# Patient Record
Sex: MALE
[De-identification: names, ages, dates, MRNs, and addresses within clinical notes are randomized per-mention and may not be internally consistent; named-entity substitution may affect disease eponyms.]

---

## 2023-02-06 PROBLEM — Z00.00 ENCOUNTER FOR PREVENTIVE HEALTH EXAMINATION: Status: ACTIVE | Noted: 2023-02-06

## 2023-03-06 ENCOUNTER — APPOINTMENT (OUTPATIENT)
Dept: CARDIOLOGY | Facility: CLINIC | Age: 33
End: 2023-03-06

## 2023-03-06 DIAGNOSIS — E78.5 HYPERLIPIDEMIA, UNSPECIFIED: ICD-10-CM

## 2023-03-06 DIAGNOSIS — R07.9 CHEST PAIN, UNSPECIFIED: ICD-10-CM

## 2023-03-06 NOTE — HISTORY OF PRESENT ILLNESS
[FreeTextEntry1] : Mr. Emery is a 33yo M with PMHx of HLD who presents to [facility] care. His PMD is Dr. Nico Jeronimo. He has been complaining of CP.

## 2024-02-06 ENCOUNTER — APPOINTMENT (OUTPATIENT)
Dept: UROLOGY | Facility: CLINIC | Age: 34
End: 2024-02-06

## 2024-08-14 ENCOUNTER — NON-APPOINTMENT (OUTPATIENT)
Age: 34
End: 2024-08-14

## 2024-08-15 ENCOUNTER — APPOINTMENT (OUTPATIENT)
Dept: UROLOGY | Facility: CLINIC | Age: 34
End: 2024-08-15
Payer: COMMERCIAL

## 2024-08-15 VITALS
SYSTOLIC BLOOD PRESSURE: 123 MMHG | TEMPERATURE: 98 F | HEART RATE: 71 BPM | HEIGHT: 68 IN | DIASTOLIC BLOOD PRESSURE: 82 MMHG | RESPIRATION RATE: 17 BRPM | OXYGEN SATURATION: 97 % | WEIGHT: 171 LBS | BODY MASS INDEX: 25.91 KG/M2

## 2024-08-15 DIAGNOSIS — Z87.09 PERSONAL HISTORY OF OTHER DISEASES OF THE RESPIRATORY SYSTEM: ICD-10-CM

## 2024-08-15 DIAGNOSIS — F52.4 PREMATURE EJACULATION: ICD-10-CM

## 2024-08-15 DIAGNOSIS — Z78.9 OTHER SPECIFIED HEALTH STATUS: ICD-10-CM

## 2024-08-15 DIAGNOSIS — N47.1 PHIMOSIS: ICD-10-CM

## 2024-08-15 DIAGNOSIS — N48.1 BALANITIS: ICD-10-CM

## 2024-08-15 DIAGNOSIS — N48.6 INDURATION PENIS PLASTICA: ICD-10-CM

## 2024-08-15 PROCEDURE — 99204 OFFICE O/P NEW MOD 45 MIN: CPT

## 2024-08-15 RX ORDER — CLOTRIMAZOLE 10 MG/G
1 CREAM TOPICAL
Qty: 1 | Refills: 2 | Status: ACTIVE | COMMUNITY
Start: 2024-08-15 | End: 1900-01-01

## 2024-08-15 NOTE — LETTER HEADER
[FreeTextEntry3] : Kyree Sanchez MD [facility], [address] [place] [address]

## 2024-08-15 NOTE — ASSESSMENT
[FreeTextEntry1] : With respect to the premature ejaculation this started when it sounds like he started to get balanitis at the tight part is very much the mucosal epithelial junction of the foreskin and is not that time.  I hope that if we do agree with formation the tissue will relax.  He does have a tight frenulum he acknowledges that when he was younger there were a lot of sexual episodes where he tore it and it would bleed he still has 1 spot where it is a little scarred if necessary after we get rid of the inflammation we can do a frenular release and release the rest of it.  As far as the curve he has a thick plaque between the corpora spongiosum and corpus cavernosum.  I am going to set him up to see Dr. Ramos who is our specialist in this to see if there is anything that he really wants to do a lot of it will depend on his partner.  He is not finding it tender but she is finding it uncomfortable.  I do not see the curve that bad and I wonder if she has some vaginal issues that would be better addressed as if the vaginal issues are addressed we getting rid of the prime problem.  Finally they are not using birth control and I have not conceived in a while but he tells me his partner is having issues.  If and when they decide to look into what we can proceed further  To summarize he is can to do balanitis care betadine, tap water, c;otrimazole We will check him to see if he has an occult diabetic We will consider a frenular release He will see Dr. Ramos about his curve We will hold off on fertility issues

## 2024-08-15 NOTE — HISTORY OF PRESENT ILLNESS
[Currently Experiencing ___] :  [unfilled] [FreeTextEntry1] : Delon is a 34-year-old male born July 20, 1990 who had a year ago started developing problems at the foreskin chest cavity tighter and tighter and tighter.  He now finds that either with masturbation or sexual activity it can get uncomfortable.  The tighter it got the more sensitive it became and he does not know if it is because of the tightness or due to other issues while he notices that over the last year he is having a harder and harder time holding back ejaculation.  During foreplay it is not much of a problem but there are times when as soon as he penetrates and he will pop.  The used to have sex sometimes 2 or 3 times the same night now he is kavita if he can recycle by the next day.  He is also not looking forward to sex is much as he did as a whole issue is becoming embarrassing.  They had a better relationship in the past and they do it now but they are working on it he is with her 11 years and they have a 10-year-old son.  Finally as long as he can remember his penis this marched down about 30 degrees and has not interfered with sexual activity he just wonders what is about.

## 2024-08-15 NOTE — LETTER BODY
[Dear  ___] : Dear  [unfilled], [Consult Letter:] : I had the pleasure of evaluating your patient, [unfilled]. [Please see my note below.] : Please see my note below. [Consult Closing:] : Thank you very much for allowing me to participate in the care of this patient.  If you have any questions, please do not hesitate to contact me. [Sincerely,] : Sincerely, [FreeTextEntry2] : Jhoan Rogel MD [address] [place], [place] [id number]

## 2024-08-15 NOTE — PHYSICAL EXAM
[General Appearance - Well Developed] : well developed [General Appearance - Well Nourished] : well nourished [Normal Appearance] : normal appearance [Well Groomed] : well groomed [General Appearance - In No Acute Distress] : no acute distress [Edema] : no peripheral edema [Respiration, Rhythm And Depth] : normal respiratory rhythm and effort [Exaggerated Use Of Accessory Muscles For Inspiration] : no accessory muscle use [Auscultation Breath Sounds / Voice Sounds] : lungs were clear to auscultation bilaterally [Abdomen Soft] : soft [Abdomen Tenderness] : non-tender [Abdomen Hernia] : no hernia was discovered [Costovertebral Angle Tenderness] : no ~M costovertebral angle tenderness [Urinary Bladder Findings] : the bladder was normal on palpation [Normal Station and Gait] : the gait and station were normal for the patient's age [] : no rash [No Focal Deficits] : no focal deficits [Oriented To Time, Place, And Person] : oriented to person, place, and time [Affect] : the affect was normal [Mood] : the mood was normal [Penis Abnormality] : normal uncircumcised penis [Epididymis] : the epididymides were normal [Testes Tenderness] : no tenderness of the testes [Not Anxious] : not anxious [FreeTextEntry1] : 25 [de-identified] : tight frenulum with prior tears, mild balanitis, minimally tight tip of the foreskin. He has Peyronie's plaque that is about 7 to 8 mm wide running between the cavernosum and spongiosum almost from the region of the bulbous urethra out to the distal shaft.  He has a very mild counter plaque in the midline at the base of the penis dorsally.

## 2024-09-25 ENCOUNTER — NON-APPOINTMENT (OUTPATIENT)
Age: 34
End: 2024-09-25

## 2024-09-26 ENCOUNTER — APPOINTMENT (OUTPATIENT)
Dept: UROLOGY | Facility: CLINIC | Age: 34
End: 2024-09-26
Payer: COMMERCIAL

## 2024-09-26 VITALS
SYSTOLIC BLOOD PRESSURE: 132 MMHG | WEIGHT: 171 LBS | OXYGEN SATURATION: 98 % | HEART RATE: 75 BPM | BODY MASS INDEX: 25.91 KG/M2 | DIASTOLIC BLOOD PRESSURE: 82 MMHG | HEIGHT: 68 IN | RESPIRATION RATE: 17 BRPM | TEMPERATURE: 98 F

## 2024-09-26 DIAGNOSIS — N47.1 PHIMOSIS: ICD-10-CM

## 2024-09-26 DIAGNOSIS — F52.4 PREMATURE EJACULATION: ICD-10-CM

## 2024-09-26 DIAGNOSIS — N48.6 INDURATION PENIS PLASTICA: ICD-10-CM

## 2024-09-26 PROCEDURE — 99214 OFFICE O/P EST MOD 30 MIN: CPT

## 2024-09-26 NOTE — ASSESSMENT
[FreeTextEntry1] : SOLANGE KARIMI 34 year who presents with complaints of peyronies disease. All medical/surgical options explained below including their risks and benefits. Given pts reported angulation of 30 degrees ventrally and location of his plaque, he is not an optimal candidate for Xiaflex. Pt also reports that his curvature is not bothersome enough to the degree that he would like any procedural intervention -Patient will continue with his topical ointment for his balanitis/phimosis -All other alternatives and risks for Peyronie's treatment were explained to the patient as documented below.  At this time patient would like to continue with observation.  Also provided patient with instructional information handout for Restorex.  -We also discussed patient's issues with his premature ejaculation.  He describes that it may be largely psychological component.  At this time, I offered patient to try topical anesthetics such as Promescent delay spray, to be used 30 minutes before intercourse.  - F/u HbA1c  Patient would like to follow-up with Dr. Sanchez in about 1 month's time to discuss the possibility of having a frenular release procedure.   Peyronie's disease is a condition characterized by the development of fibrous scar tissue (plaque) in the penis, which can cause curvature, pain, and erectile dysfunction. Treatment options for Peyronie's disease aim to alleviate symptoms, improve sexual function, and address the underlying penile curvature. The choice of treatment depends on the severity of the condition and the individual's preferences. The following options were discussed with the patient:   Observation: In some cases, Peyronie's disease may stabilize or improve on its own without treatment. Therefore, in mild cases a wait-and-see approach, especially if the curvature is not causing significant discomfort or erectile dysfunction.   Oral Medications: Some medications, such as pentoxifylline and colchicine, have been used to reduce pain and prevent worsening of plaque size. However, current urologic literature does have any high quality data on its efficacy.   Injectable Medications: Verapamil or collagenase clostridium histolyticum (Xiaflex) can be injected directly into the penile plaque to break it down and potentially reduce curvature. Xiaflex is FDA-approved for this purpose and may be more effective than oral medications. Usually four treatment cycles are required to maximize efficacy, two injections each cycle  by 6 weeks. No sexual intercourse is allowed 4 weeks following the first injection of each cycle. On average literature reports about a 17 degree improvement from baseline curvature or ~30% improvement in overall curvature. Risks associated with these procedures include severe ecchymosis and swelling at the site of injection. A severe allergic skin reaction or SOB. There is also a risk of penile fracture which would need to be treated urgently surgically in the operating room and has a risk of resulting in permanent erectile dysfunction and/or worsening of penile curvature.   Penile Traction Devices: These devices are designed to apply gentle and consistent traction to the penis over time. They may help reduce curvature and improve penile straightening but long term outcomes do not clearly demonstrate significant improvement in penile angulation.    Vacuum Erection Devices (VEDs): VEDs can help maintain penile health and prevent further curvature by promoting blood flow. They can also be used in conjunction with other treatments.   Surgery This typically considered when conservative treatments have not been effective, or the curvature is severe and causing significant issues with sexual function. Surgical options may include:   Penile plication: This involves suturing the opposite side of the plaque to straighten the penis. This usually results in shortening of the phallus. Complications of surgery such as permanent erectile dysfunction, infection, wound dehiscence, hematoma, bleeding, injury to nearby structures, including but not limited to the dorsal neurovascular bundle and urethra.   Plaque excision/Grafting: In cases of severe deformity or extensive plaque, grafting procedures may be performed to replace the affected tissue. However, this has a high association with iatrogenic erectile dysfunction including prior mentioned surgical complications. Other complications include graft infection, ballooning of graft, worsening of curvature with contraction of graft, and penile fracture.   Penile prosthesis implantation: This is an option for men with Peyronie's disease who also have severe erectile dysfunction. In addition to straightening the penis, a penile implant can restore the ability to achieve an erection.   Risks of recurrence of curvature also addressed including prolonged recovery time of up to ~3 months.  Patient yet no another vascular follow-up

## 2024-09-26 NOTE — HISTORY OF PRESENT ILLNESS
[FreeTextEntry1] : Mr. SOLANGE KARIMI is a 34 year male w/ a pmhx of phimosis, HLD who presents with complaints of penile curvature consistent with peyronies disease.  He describes his curvature is approximately 30 degrees ventrally. He has noted his curvature for as long as he can remember. He does not complain of any associated penile pain while either erect or flaccid. His curvature does not prevent him from having penetrative sexual intercourse. He denies having any erectile dysfunction. Denies any history of genital trauma. He has not been previously treated for his penile curvature. Also complains of premature ejaculation only lasting a few minutes.   Of note, patient was unable to get his blood work prior to this visit.  Today we juan his labs to assess his hemoglobin A1c as per his prior visit with Dr. Sanchez.  Given his issues with phimosis and balanitis there is a concern for undiagnosed DM.  He has been applying topical ointment daily and has had maybe a mild improvement.

## 2024-09-26 NOTE — PLAN

## 2024-09-26 NOTE — PHYSICAL EXAM
[Penis Abnormality] : normal uncircumcised penis [de-identified] : Mild erythema along the tip of his foreskin but easily able to retract beyond the corona of his glans.  Some scarring noted at the frenulum, no active bleeding.  Small plaque palpated on his lateral ventral shaft about a centimeter in size

## 2024-09-27 LAB
ESTIMATED AVERAGE GLUCOSE: 105 MG/DL
HBA1C MFR BLD HPLC: 5.3 %

## 2024-10-31 ENCOUNTER — APPOINTMENT (OUTPATIENT)
Dept: UROLOGY | Facility: CLINIC | Age: 34
End: 2024-10-31

## 2024-11-27 ENCOUNTER — APPOINTMENT (OUTPATIENT)
Dept: UROLOGY | Facility: CLINIC | Age: 34
End: 2024-11-27

## 2025-03-13 ENCOUNTER — APPOINTMENT (OUTPATIENT)
Dept: UROLOGY | Facility: CLINIC | Age: 35
End: 2025-03-13
Payer: COMMERCIAL

## 2025-03-13 ENCOUNTER — TRANSCRIPTION ENCOUNTER (OUTPATIENT)
Age: 35
End: 2025-03-13

## 2025-03-13 VITALS
BODY MASS INDEX: 26.52 KG/M2 | HEART RATE: 69 BPM | DIASTOLIC BLOOD PRESSURE: 81 MMHG | SYSTOLIC BLOOD PRESSURE: 120 MMHG | HEIGHT: 68 IN | RESPIRATION RATE: 16 BRPM | TEMPERATURE: 98.1 F | WEIGHT: 175 LBS | OXYGEN SATURATION: 96 %

## 2025-03-13 PROCEDURE — 99213 OFFICE O/P EST LOW 20 MIN: CPT

## 2025-04-21 ENCOUNTER — EMERGENCY (EMERGENCY)
Facility: HOSPITAL | Age: 35
LOS: 0 days | Discharge: HOME | End: 2025-04-21
Attending: EMERGENCY MEDICINE
Payer: COMMERCIAL

## 2025-04-21 VITALS
WEIGHT: 169.98 LBS | SYSTOLIC BLOOD PRESSURE: 127 MMHG | RESPIRATION RATE: 18 BRPM | OXYGEN SATURATION: 97 % | DIASTOLIC BLOOD PRESSURE: 86 MMHG | HEIGHT: 69 IN | HEART RATE: 88 BPM | TEMPERATURE: 98 F

## 2025-04-21 DIAGNOSIS — R05.1 ACUTE COUGH: ICD-10-CM

## 2025-04-21 DIAGNOSIS — R50.9 FEVER, UNSPECIFIED: ICD-10-CM

## 2025-04-21 DIAGNOSIS — R07.9 CHEST PAIN, UNSPECIFIED: ICD-10-CM

## 2025-04-21 LAB
FLUAV AG NPH QL: SIGNIFICANT CHANGE UP
FLUBV AG NPH QL: DETECTED
RSV RNA NPH QL NAA+NON-PROBE: SIGNIFICANT CHANGE UP
SARS-COV-2 RNA SPEC QL NAA+PROBE: SIGNIFICANT CHANGE UP
SOURCE RESPIRATORY: SIGNIFICANT CHANGE UP

## 2025-04-21 PROCEDURE — 71046 X-RAY EXAM CHEST 2 VIEWS: CPT | Mod: 26

## 2025-04-21 PROCEDURE — 99285 EMERGENCY DEPT VISIT HI MDM: CPT | Mod: 25

## 2025-04-21 PROCEDURE — 99284 EMERGENCY DEPT VISIT MOD MDM: CPT | Mod: 25

## 2025-04-21 PROCEDURE — 93005 ELECTROCARDIOGRAM TRACING: CPT

## 2025-04-21 PROCEDURE — 71046 X-RAY EXAM CHEST 2 VIEWS: CPT

## 2025-04-21 PROCEDURE — 93010 ELECTROCARDIOGRAM REPORT: CPT

## 2025-04-21 PROCEDURE — 0241U: CPT

## 2025-04-21 RX ORDER — DOXYCYCLINE HYCLATE 100 MG
1 TABLET ORAL
Qty: 10 | Refills: 0
Start: 2025-04-21 | End: 2025-04-25

## 2025-04-21 RX ORDER — DEXAMETHASONE 0.5 MG/1
10 TABLET ORAL ONCE
Refills: 0 | Status: COMPLETED | OUTPATIENT
Start: 2025-04-21 | End: 2025-04-21

## 2025-04-21 RX ADMIN — DEXAMETHASONE 10 MILLIGRAM(S): 0.5 TABLET ORAL at 09:23

## 2025-04-21 NOTE — ED PROVIDER NOTE - ATTENDING CONTRIBUTION TO CARE
Patient has cough and subjective fever.  Family member has similar symptoms.  Chest shows scant rales.  Chest x-ray may show left-sided infiltrate.  Oral antibiotics ordered.  Swab pending.

## 2025-04-21 NOTE — ED PROVIDER NOTE - NSFOLLOWUPINSTRUCTIONS_ED_ALL_ED_FT
Upper Respiratory Infection, Adult  An upper respiratory infection (URI) is a common viral infection of the nose, throat, and upper air passages that lead to the lungs. The most common type of URI is the common cold. URIs usually get better on their own, without medical treatment.    What are the causes?  A URI is caused by a virus. You may catch a virus by:    Breathing in droplets from an infected person's cough or sneeze.  Touching something that has been exposed to the virus (contaminated) and then touching your mouth, nose, or eyes.    What increases the risk?  You are more likely to get a URI if:    You are very young or very old.  It is joyce or winter.  You have close contact with others, such as at a , school, or health care facility.  You smoke.  You have long-term (chronic) heart or lung disease.  You have a weakened disease-fighting (immune) system.  You have nasal allergies or asthma.  You are experiencing a lot of stress.  You work in an area that has poor air circulation.  You have poor nutrition.    What are the signs or symptoms?  A URI usually involves some of the following symptoms:    Runny or stuffy (congested) nose.  Sneezing.  Cough.  Sore throat.  Headache.  Fatigue.  Fever.  Loss of appetite.  Pain in your forehead, behind your eyes, and over your cheekbones (sinus pain).  Muscle aches.  Redness or irritation of the eyes.  Pressure in the ears or face.    How is this diagnosed?  This condition may be diagnosed based on your medical history and symptoms, and a physical exam. Your health care provider may use a cotton swab to take a mucus sample from your nose (nasal swab). This sample can be tested to determine what virus is causing the illness.    How is this treated?  URIs usually get better on their own within 7–10 days. You can take steps at home to relieve your symptoms. Medicines cannot cure URIs, but your health care provider may recommend certain medicines to help relieve symptoms, such as:    Over-the-counter cold medicines.  Cough suppressants. Coughing is a type of defense against infection that helps to clear the respiratory system, so take these medicines only as recommended by your health care provider.  Fever-reducing medicines.    Follow these instructions at home:  Activity     Rest as needed.  If you have a fever, stay home from work or school until your fever is gone or until your health care provider says you are no longer contagious. Your health care provider may have you wear a face mask to prevent your infection from spreading.  Relieving symptoms     Gargle with a salt-water mixture 3–4 times a day or as needed. To make a salt-water mixture, completely dissolve ½–1 tsp of salt in 1 cup of warm water.  Use a cool-mist humidifier to add moisture to the air. This can help you breathe more easily.  Eating and drinking     Drink enough fluid to keep your urine pale yellow.  ImageEat soups and other clear broths.  General instructions     Take over-the-counter and prescription medicines only as told by your health care provider. These include cold medicines, fever reducers, and cough suppressants.  Do not use any products that contain nicotine or tobacco, such as cigarettes and e-cigarettes. If you need help quitting, ask your health care provider.   Stay away from secondhand smoke.  Stay up to date on all immunizations, including the yearly (annual) flu vaccine.  ImageKeep all follow-up visits as told by your health care provider. This is important.  How to prevent the spread of infection to others     ImageURIs can be passed from person to person (are contagious). To prevent the infection from spreading:    Wash your hands often with soap and water. If soap and water are not available, use hand .  Avoid touching your mouth, face, eyes, or nose.  Cough or sneeze into a tissue or your sleeve or elbow instead of into your hand or into the air.    Contact a health care provider if:  You are getting worse instead of better.  You have a fever or chills.  Your mucus is brown or red.  You have yellow or brown discharge coming from your nose.  You have pain in your face, especially when you bend forward.  You have swollen neck glands.  You have pain while swallowing.  You have white areas in the back of your throat.  Get help right away if:  You have shortness of breath that gets worse.  You have severe or persistent:    Headache.  Ear pain.  Sinus pain.  Chest pain.    You have chronic lung disease along with any of the following:    Wheezing.  Prolonged cough.  Coughing up blood.  A change in your usual mucus.    You have a stiff neck.  You have changes in your:    Vision.  Hearing.  Thinking.  Mood.    Summary  An upper respiratory infection (URI) is a common infection of the nose, throat, and upper air passages that lead to the lungs.  A URI is caused by a virus.  URIs usually get better on their own within 7–10 days.  Medicines cannot cure URIs, but your health care provider may recommend certain medicines to help relieve symptoms.

## 2025-04-21 NOTE — ED PROVIDER NOTE - CARE PROVIDER_API CALL
Nico Jeronimo  [address] [address]  Phone: [phone]  Fax: [phone]  Follow Up Time: Routine

## 2025-04-21 NOTE — ED PROVIDER NOTE - OBJECTIVE STATEMENT
34-year-old male with no significant past medical history presents for flu-like symptoms x 3 days.  Reports subjective fevers, body aches, chills, nonproductive cough associated with chest pain.  No nausea, vomiting, diarrhea, abdominal pain, urinary symptoms, weakness, numbness.  Presents with wife who has similar symptoms.  Denies family history of cardiac events.  Denies tobacco use, alcohol use, substance use.

## 2025-04-21 NOTE — ED PROVIDER NOTE - PATIENT PORTAL LINK FT
You can access the FollowMyHealth Patient Portal offered by [facility] by registering at the following website: [email or web address]. By joining [employer]’s FollowMyHealth portal, you will also be able to view your health information using other applications (apps) compatible with our system.

## 2025-04-21 NOTE — ED PROVIDER NOTE - PHYSICAL EXAMINATION
VITAL SIGNS: I have reviewed nursing notes and confirm.  CONSTITUTIONAL: well-appearing, non-toxic, NAD  SKIN: Warm dry, normal skin turgor, no acute rash, no bruising  HEAD: NCAT  EYES: EOMI, PERRLA, no scleral icterus, normal conjunctiva  ENT: Moist mucous membranes, OR clear. Normal pharynx. nasal congestion present.   NECK: Supple; non tender. Full ROM. No cervical LAD  CARD: RRR, no murmurs, rubs or gallops  RESP: Crackles present on L middle and lower lobe. No increased WOB.  ABD: soft, + BS, non-tender, non-distended, no rebound or guarding. No CVA tenderness  EXT: Full ROM, no bony tenderness, pulses intact in bilateral UE and LE, no pedal edema, no calf tenderness  NEURO: normal motor. normal sensory. Normal gait.  PSYCH: Cooperative, appropriate.

## 2025-04-21 NOTE — ED ADULT NURSE NOTE - NSFALLUNIVINTERV_ED_ALL_ED
Bed/Stretcher in lowest position, wheels locked, appropriate side rails in place/Call bell, personal items and telephone in reach/Instruct patient to call for assistance before getting out of bed/chair/stretcher/Non-slip footwear applied when patient is off stretcher/[place] to call system/Physically safe environment - no spills, clutter or unnecessary equipment/Purposeful proactive rounding/Room/bathroom lighting operational, light cord in reach

## 2025-05-08 ENCOUNTER — APPOINTMENT (OUTPATIENT)
Dept: UROLOGY | Facility: CLINIC | Age: 35
End: 2025-05-08

## 2025-07-03 NOTE — ED PROVIDER NOTE - PROVIDER WHO IDEPENDENTLY INTERPRETED
Problem: At Risk for Falls  Goal: Patient does not fall  7/2/2025 2241 by Ruiz Zamarripa RN  Outcome: Monitoring/Evaluating progress  7/2/2025 2240 by Ruiz Zamarripa RN  Outcome: Monitoring/Evaluating progress  Goal: Patient takes action to control fall-related risks  7/2/2025 2241 by Ruiz Zamarripa RN  Outcome: Monitoring/Evaluating progress  7/2/2025 2240 by Ruiz Zamarripa RN  Outcome: Monitoring/Evaluating progress     Problem: At Risk for Injury Due to Fall  Goal: Patient does not fall  7/2/2025 2241 by Ruiz Zamarripa RN  Outcome: Monitoring/Evaluating progress  7/2/2025 2240 by Ruiz Zamarripa RN  Outcome: Monitoring/Evaluating progress  Goal: Takes action to control condition specific risks  7/2/2025 2241 by Ruiz Zamarripa RN  Outcome: Monitoring/Evaluating progress  7/2/2025 2240 by Ruiz Zamarripa RN  Outcome: Monitoring/Evaluating progress  Goal: Verbalizes understanding of fall-related injury personal risks  Description: Document education using the patient education activity  7/2/2025 2241 by Ruiz Zamarripa RN  Outcome: Monitoring/Evaluating progress  7/2/2025 2240 by Ruiz Zamarripa RN  Outcome: Monitoring/Evaluating progress     Problem: Skin Integrity Alteration  Goal: Skin remains intact with no new/deterioration of wound or pressure injury  7/2/2025 2241 by Ruiz Zamarripa RN  Outcome: Monitoring/Evaluating progress  7/2/2025 2240 by Ruiz Zamarripa RN  Outcome: Monitoring/Evaluating progress  Goal: Participates in wound care activities  7/2/2025 2241 by Ruiz Zamarripa RN  Outcome: Monitoring/Evaluating progress  7/2/2025 2240 by Ruiz Zamarripa RN  Outcome: Monitoring/Evaluating progress     Problem: Impaired Physical Mobility  Goal: Functional status is maintained or returned to baseline during hospitalization  7/2/2025 2241 by Ruiz Zamarripa RN  Outcome: Monitoring/Evaluating progress  7/2/2025 2240 by Ruiz Zamarripa RN  Outcome: Monitoring/Evaluating progress  Goal:  Tolerates activity for discharge setting with no abnormal symptoms  7/2/2025 2241 by Ruiz Zamarripa RN  Outcome: Monitoring/Evaluating progress  7/2/2025 2240 by Ruiz Zamarripa RN  Outcome: Monitoring/Evaluating progress     Problem: Fluid Volume Excess  Goal: Fluid Volume Excess Symptoms Resolved  Description: Treatment often consists of oxygen and respiratory support with diuretic therapy at doses that exceed usual dose (typically doubled).  Monitor patient response to treatment.  Acute dyspnea should resolve quickly if dose is adequate and kidney function is adequate. Dyspnea/SOB should only be observed with Activity after effective treatment. Patient should be able to lie down comfortably, without SOB.  7/2/2025 2241 by Ruiz Zamarripa RN  Outcome: Monitoring/Evaluating progress  7/2/2025 2240 by Ruiz Zamarripa RN  Outcome: Monitoring/Evaluating progress  Goal: Oxygenation is maintained (SpO2 greater than or equal to 90% or as ordered)  7/2/2025 2241 by Ruiz Zamarripa RN  Outcome: Monitoring/Evaluating progress  7/2/2025 2240 by Ruiz Zamarripa RN  Outcome: Monitoring/Evaluating progress      Tapan Chapin MD